# Patient Record
Sex: MALE | URBAN - METROPOLITAN AREA
[De-identification: names, ages, dates, MRNs, and addresses within clinical notes are randomized per-mention and may not be internally consistent; named-entity substitution may affect disease eponyms.]

---

## 2022-10-14 ENCOUNTER — ATHLETIC TRAINING (OUTPATIENT)
Dept: SPORTS MEDICINE | Facility: OTHER | Age: 22
End: 2022-10-14

## 2022-10-14 DIAGNOSIS — M25.552 LEFT HIP PAIN: Primary | ICD-10-CM

## 2022-10-14 DIAGNOSIS — S76.012A STRAIN OF HIP FLEXOR, LEFT, INITIAL ENCOUNTER: ICD-10-CM

## 2022-10-16 ENCOUNTER — ATHLETIC TRAINING (OUTPATIENT)
Dept: SPORTS MEDICINE | Facility: OTHER | Age: 22
End: 2022-10-16

## 2022-10-16 DIAGNOSIS — M25.552 LEFT HIP PAIN: Primary | ICD-10-CM

## 2022-10-16 DIAGNOSIS — S76.012D STRAIN OF HIP FLEXOR, LEFT, SUBSEQUENT ENCOUNTER: ICD-10-CM

## 2022-10-17 NOTE — PROGRESS NOTES
Athletic Training Hip/Thigh Evaluation     Name: Hoda Evans  Age: 23 y o    School District: [employer]  Sport: Wrestling  Date of Assessment: 10/14/2022     Assessment/Plan:      Visit Diagnosis: Left hip pain [M25 552]     Treatment Plan: Decrease pain, Hip Mobility and strength    [x]  Follow-up PRN  []  Follow-up prior to next practice/game for re-evaluation  [x]  Daily treatment/rehab  Progress note expected weekly  Referral:      [x]  Not needed at this time  []  Referred to:      [x]  Coaching staff notified  []  Parent/Guardian Notified      Subjective:     Date of Injury: 10/13/22     Injury occurred during:      [x]  Practice  []  Competition  []  Other:      Mechanism: Pt stated they noticed discomfort yesterday 10/13/22 and thought it was just sore  Pt cont to feel it today and his teammate suggested to get it checked out as they are just starting their season      Previous History: Knee pathology     Reported Symptoms:      [] Pain with rest [] Pressure   [x] Pain with activity [] Burning   [x] Pain with stairs [] Weakness   [x] Sharp pain [] Loss of motion   [] Dull pain [x] Clicking   [] Felt pop [] Snapping sensation   [] Felt give way [] Radiating pain   [] Grinding          Objective:    Observation:      []  No observable findings compared bilaterally     [] Swelling [] Genu recurvatum   [] Deformity [] Genu valgum   [] Ecchymosis [] Genu varus   [x] Abnormal gait [] Hip anteversion   [] Atrophy [] Hip retroversion   [x] Muscle spasm [] Patella abnormality   [] Spine curvature          Palpation: TTP over aidan lateral hip     Active Range of Motion:        Full  ROM Limited  ROM Pain  with  ROM No  Motion   Hip Flexion  [x] [] [x] []   Hip Extension [x] [] [] []   Hip Abduction [x] [] [x] []   Hip Adduction [] [] [] []   Hip Internal Rotation [x] [] [x] []   Hip External Rotation [x] [] [x] []   Knee Flexion [x] [] [] []   Knee Extension [x] [] [] []      Manual Muscle Tests: Not performed []                     5 4+ 4 4- 3 or  Under   Hip Flexion  [x] [] [] [] []   Hip Extension [x] [] [] [] []   Hip Abduction [] [x] [] [] []   Hip Adduction [] [x] [] [] []   Hip Internal Rotation [x] [] [] [] []   Hip External Rotation [x] [] [] [] []   Knee Flexion [x] [] [] [] []   Knee Extension [x] [] [] [] []      Special Tests:        (+)  Tightness (+)  Pain (-)  WNL Not  Tested   Fulcrum [] [] [] [x]   Ely’s [] [] [] [x]   Lynda Fly [] [] [x] []   [place] (Modified Lynda Bill) [] [] [x] []   Katie Manley []  [] [] [x]   Piriformis [] [] [] [x]   CHEMA [] [] [] [x]   FADIR [] [] [] [x]   SI Compression/Distraction [] [] [] [x]     (+)  Clicking (+)  Pain (-)  WNL Not  Tested   Hip Scour [] [] [] [x]     (+)  POS   (-)  WNL Not  Tested   Long Sit Test [] Leg  Discrepancy [] [x]   Trendelenberg's  [] Pelvic  Drop [] [x]       Treatment Log:     Date:  10/14/22   Playing Status:  As Tolerated         Exercise/Treatment      ice bag 20min

## 2022-10-18 NOTE — PROGRESS NOTES
Athletic Training Progress Note    Name: Salima Tai  Age: 23 y o  Assessment/Plan:     Visit Diagnosis: Left hip pain [M25 552]    Treatment Plan: Increase hip mobility/strength    []  Follow-up PRN  []  Follow-up prior to next practice/game for re-evaluation  [x]  Daily treatment/rehab  Progress note expected weekly  Subjective: Pt reported to the [facility] today to cont treatment on their hip flexor  Pt stated the wrap and ice bath yesterday helped  PT stated their discomfort is less today than it was the first day        Objective:   See treatment log below    Treatment Log:       Date: 10/16/22  10/14/22   Playing Status: As Tolerated  As Tolerated          Exercise/Treatment       ice bag  20min    MHP 10min      supine SLR  3x10 7 5#      supine banded hip flexion 3x10      Standing 4-way hip banded 3x10      Box heel taps 3x10      ice bath 12min                                  Pt will cont to make appointments for rehab 2-3x/wk

## 2022-11-01 ENCOUNTER — ATHLETIC TRAINING (OUTPATIENT)
Dept: SPORTS MEDICINE | Facility: OTHER | Age: 22
End: 2022-11-01

## 2022-11-01 DIAGNOSIS — S76.012D STRAIN OF HIP FLEXOR, LEFT, SUBSEQUENT ENCOUNTER: Primary | ICD-10-CM

## 2022-11-01 DIAGNOSIS — M25.552 LEFT HIP PAIN: ICD-10-CM

## 2022-11-01 NOTE — PROGRESS NOTES
Athletic Training Progress Note    Name: Zoey Tapia  Age: 23 y o  Assessment/Plan:     Visit Diagnosis: Strain of hip flexor, left, subsequent encounter [S74 136D]    Treatment Plan: Increase hip mobility/strength    []  Follow-up PRN  []  Follow-up prior to next practice/game for re-evaluation  [x]  Daily treatment/rehab  Progress note expected weekly  Subjective: Pt reported to the [facility] today to cont treatment on their hip flexor  Pt stated the wrap and ice bath yesterday helped  PT stated their discomfort is less today than it was the first day  Objective:   See treatment log below    Treatment Log:       Date: 11/1 10/16/22  10/14/22   Playing Status: As tolerated As Tolerated  As Tolerated           Exercise/Treatment        ice bag   20min    MHP  10min      supine SLR   3x10 7 5#      supine banded hip flexion  3x10      Standing 4-way hip banded  3x10      Box heel taps  3x10      ice bath 12' 12min      PRRT 3'       Hip flexion eccentric 3x4                       Pt will cont to make appointments for rehab 2-3x/wk    11/1  Pt is preparing to wrestle on Saturday 11/5  He is still limping and not wrestling live yet    LB ATC

## 2022-11-03 ENCOUNTER — ATHLETIC TRAINING (OUTPATIENT)
Dept: SPORTS MEDICINE | Facility: OTHER | Age: 22
End: 2022-11-03

## 2022-11-03 DIAGNOSIS — S76.012D STRAIN OF HIP FLEXOR, LEFT, SUBSEQUENT ENCOUNTER: Primary | ICD-10-CM

## 2022-11-03 DIAGNOSIS — M25.552 LEFT HIP PAIN: ICD-10-CM

## 2022-11-04 NOTE — PROGRESS NOTES
Athletic Training Progress Note    Name: Vivek Su  Age: 23 y o  Assessment/Plan:     Visit Diagnosis: Strain of hip flexor, left, subsequent encounter [S75 693D]    Treatment Plan: Increase hip mobility/strength    []  Follow-up PRN  []  Follow-up prior to next practice/game for re-evaluation  [x]  Daily treatment/rehab  Progress note expected weekly  Subjective: Pt reported to the [facility] today to cont treatment on their hip flexor  Pt stated the wrap and ice bath yesterday helped  PT stated their discomfort is less today than it was the first day  Objective:   See treatment log below    Treatment Log:       Date: 11/3 11/1 10/16/22  10/14/22   Playing Status: As tolerated As tolerated As Tolerated  As Tolerated            Exercise/Treatment         ice bag    20min    MHP   10min      supine SLR    3x10 7 5#      supine banded hip flexion   3x10      Standing 4-way hip banded   3x10      Box heel taps   3x10      ice bath  12' 12min      PRRT 3' 3'       Hip flexion eccentric 3x4 3x4       Normatec 15'                 Pt will cont to make appointments for rehab 2-3x/wk    11/3  Pt states he is having trouble sleeping  Completed PRRT to decrease tightness and pain with ADLs  Will stance and move at practice  LB ATC    11/1  Pt is preparing to wrestle on Saturday 11/5  He is still limping and not wrestling live yet    LB ATC

## 2022-12-10 ENCOUNTER — ATHLETIC TRAINING (OUTPATIENT)
Dept: SPORTS MEDICINE | Facility: OTHER | Age: 22
End: 2022-12-10

## 2022-12-10 DIAGNOSIS — M25.552 LEFT HIP PAIN: Primary | ICD-10-CM

## 2022-12-10 DIAGNOSIS — S76.012A HIP STRAIN, LEFT, INITIAL ENCOUNTER: ICD-10-CM

## 2022-12-11 NOTE — PROGRESS NOTES
Athletic Training Progress Note    Name: Levis Soulier  Age: 23 y o  Assessment/Plan:     Visit Diagnosis: No primary diagnosis found  Treatment Plan: Increase hip mobility/strength    []  Follow-up PRN  []  Follow-up prior to next practice/game for re-evaluation  [x]  Daily treatment/rehab  Progress note expected weekly  Subjective: Pt reported to the [facility] today to cont treatment on their hip flexor  Pt stated the wrap and ice bath yesterday helped  PT stated their discomfort is less today than it was the first day  Objective:   See treatment log below    Treatment Log:       Date: 11/3 11/1 10/16/22  10/14/22   Playing Status: As tolerated As tolerated As Tolerated  As Tolerated            Exercise/Treatment         ice bag    20min    MHP   10min      supine SLR    3x10 7 5#      supine banded hip flexion   3x10      Standing 4-way hip banded   3x10      Box heel taps   3x10      ice bath  12' 12min      PRRT 3' 3'       Hip flexion eccentric 3x4 3x4       Normatec 15'                 Pt will cont to make appointments for rehab 2-3x/wk  12/10  Pt was able to compete during the first match of the year and has been continuing without any issue in his Left hip  LB ATC    11/3  Pt states he is having trouble sleeping  Completed PRRT to decrease tightness and pain with ADLs  Will stance and move at practice  LB ATC    11/1  Pt is preparing to wrestle on Saturday 11/5  He is still limping and not wrestling live yet    LB ATC

## 2022-12-13 ENCOUNTER — ATHLETIC TRAINING (OUTPATIENT)
Dept: SPORTS MEDICINE | Facility: OTHER | Age: 22
End: 2022-12-13

## 2022-12-13 DIAGNOSIS — M79.641 HAND PAIN, RIGHT: ICD-10-CM

## 2022-12-13 DIAGNOSIS — L03.011 CELLULITIS OF RIGHT THUMB: Primary | ICD-10-CM

## 2022-12-13 NOTE — PROGRESS NOTES
12/13  Pt was in the hospital for Right Thumb cellulitis after a bite during wrestling on 12/3    LB ATC

## 2023-01-21 ENCOUNTER — ATHLETIC TRAINING (OUTPATIENT)
Dept: SPORTS MEDICINE | Facility: OTHER | Age: 23
End: 2023-01-21

## 2023-01-21 DIAGNOSIS — S96.911A STRAIN OF RIGHT ANKLE, INITIAL ENCOUNTER: Primary | ICD-10-CM

## 2023-01-21 DIAGNOSIS — M25.571 ACUTE RIGHT ANKLE PAIN: ICD-10-CM

## 2023-01-21 NOTE — PROGRESS NOTES
1/21/23  A: Right Ankle strain  S: Hurt after the match  Wrestled again  O: Limited DF to pain  P: Wrestling one more match    LB ATC

## 2023-10-13 ENCOUNTER — ATHLETIC TRAINING (OUTPATIENT)
Dept: SPORTS MEDICINE | Facility: OTHER | Age: 23
End: 2023-10-13

## 2023-10-13 DIAGNOSIS — M76.899 QUADRICEPS TENDONITIS: Primary | ICD-10-CM

## 2023-10-13 NOTE — PROGRESS NOTES
Athletic Training Knee Evaluation    Name: Norma Mcclendon  Age: 26 y.o.   School District: [employer]   Sport: Men's Wrestling  Date of Assessment: 10/13/2023    Assessment/Plan:     Visit Diagnosis: Quadriceps tendonitis [M76.899]    Treatment Plan:     []  Follow-up PRN. [x]  Follow-up prior to next practice/game for re-evaluation. []  Daily treatment/rehab. Progress note expected weekly.      Referral:     []  Not needed at this time  []  Referred to:     [x]  Coaching staff notified  []  Parent/Guardian Notified    Subjective:    Date of Injury: 10/13/23    Injury occurred during:     [x]  Practice  []  Competition  []  Other: And going for a run before practice    Mechanism: Running    Previous History: Previous surgeries of left knee    Reported Symptoms:     [] Felt pop [] Grinding   [] Kim Duster a pop [] Pressure   [] Pain with rest [] Burning   [x] Pain with activity [] Weakness   [] Pain with stairs [] Loss of motion   [] Sharp pain [] Clicking   [x] Dull pain [] Snapping sensation   [] Radiating pain [] Locking   [] Felt give way       Objective:    Observation:     [x]  No observable findings compared bilaterally    [] Swelling [] Genu recurvatum   [] Effusion [] Genu valgum   [] Deformity [] Genu varus   [] Ecchymosis [] Patella sekou   [] Abnormal gait [] Patella baja   [] Atrophy [] Squinting patellae   [] Muscle spasm [] “Frog eye” patellae     Palpation: TTP on quad tendon and medial patella    Active Range of Motion:      Full  ROM Limited  ROM Pain  with  ROM No  Motion   Knee Flexion [x] [] [] []   Knee Extension [x] [] [] []   Hip Flexion [x] [] [] []   Hip Extension [x] [] [] []   Hip Abduction [x] [] [] []   Hip Adduction [x] [] [] []   Dorsiflexion [] [] [] []   Plantarflexion [] [] [] []     Manual Muscle Tests:     Not performed []             5 4+ 4 4- 3 or  Under   Knee Flexion [x] [] [] [] []   Knee Extension [x] [] [] [] []   Hip Flexion [x] [] [] [] []   Hip Extension [x] [] [] [] []   Hip Abduction [x] [] [] [] []   Hip Adduction [x] [] [] [] []   Dorsiflexion [] [] [] [] []   Plantarflexion [] [] [] [] []     Special Tests:      (+)  Laxity (+)  Pain (-)  WNL Not  Tested   Lachman [] [] [x] []   Anterior Drawer [] [] [x] []   Pivot Shift [] [] [] []   Posterior Drawer [] [] [x] []   Sag [] [] [] []   Valgus (0 Degrees) [] [] [x] []   Valgus (30 Degrees) [] [] [x] []   Varus (0 Degrees) [] [] [x] []   Varus (30 Degrees) [] [] [x] []   Rosetta [] [] [x] []   Thessally's [] [] [x] []   Apley's [] [] [x] []   Reagan's [] [] [] []   Patellar Apprehension [] [] [] []   Patellar Glide [] [] [] []   Ballotable Patella  [] [] [] []     Treatment Log:  Pt was instructed to come back if there was no improvement in their knee.   Date:    Playing Status:        Exercise/Treatment

## 2024-11-25 ENCOUNTER — ATHLETIC TRAINING (OUTPATIENT)
Dept: SPORTS MEDICINE | Facility: OTHER | Age: 24
End: 2024-11-25

## 2024-11-25 DIAGNOSIS — S06.0X0A CONCUSSION WITHOUT LOSS OF CONSCIOUSNESS, INITIAL ENCOUNTER: Primary | ICD-10-CM

## 2024-11-25 NOTE — PROGRESS NOTES
"Athletic Training Head Injury Evaluation     Name: Sam Barkley  Age: 24 y.o.   Sport: Wrestling     Assessment/Plan  Visit Diagnosis: Concussion without loss of consciousness     Treatment Plan:   [x]  Follow-up daily.   []  Follow-up prior to next practice/game for re-evaluation.  []  Daily treatment/rehab. Progress note expected weekly.      Referral:   []  Not needed at this time  []  Will re-evaluate tomorrow to determine if referral is needed  [x]  Referred to: Will be scheduled with physician     Subjective:  Date of Assessment: 11/25/2024  Date of Injury: 11/23     History: Ath was competing in wrestling match Saturday 11/23 when he was hit in the eye. He was sent to ED and had his eyelid glued due to laceration. He reports feeling concussive symptoms then, but wanted to leave the ED and be treated by his AT staff for this. He comes in today stating he has had symptoms throughout the weekend with sensitivity to light and pressure in the head being the worst. Ath did not vomit but states he was close to it due to nausea.     How many diagnosed concussions has the athlete had in the past?: 0         Has the athlete ever been: Yes No   Hospitalized for a head injury? [] [x]   Diagnosed/treated for headache disorder or migraines? [] [x]   Diagnosed with a learning disability/dyslexia? [] [x]   Diagnosed with ADD/ADHD [] [x]   Diagnosed with depression, anxiety, or other psychiatric disorder? [] [x]      Current medications? If yes, please list:   [x]  None  []  Yes:          Symptom Evaluation     0 = No Symptoms; 1 or 2 = Mild Symptoms; 3 or 4 = Moderate Symptoms, 5 or 6 = Severe Symptoms       (Insert Columns as needed for subsequent dates)  Date: 11/25/2024   Symptom Symptom Score   Headache 3    Pressure in head  5   Neck Pain  0   Nausea or vomiting  4   Dizziness  4   Blurred Vision  4   Balance Problems  2   Sensitivity to light  6   Sensitivity to noise  5   Feeling slowed down  3   Feeling like \"in a " "fog\"  3   Don't feel right  4   Difficulty concentrating  3   Difficulty remembering  3   Fatigue or low energy  2   Confusion  1   Drowsiness  4   More emotional  4   Irritability  1   Sadness  2   Nervous or Anxious  1   Trouble falling asleep (if applicable)  0   Total number of Symptoms (of 22):  20   Symptom Severity Score (of 132):  64   Do your symptoms get worse with physical activity?  Yes   Do your symptoms get worse with mental activity?  Not sure         If 100% is feeling perfectly normal, what percent of normal do you feel?: 40%     If not 100%, why?: Feel like loud sounds or extreme light or a hit to the head would hurt extremely long     Cognitive Screening     Orientation 0 1   What month is it? [] [x]   What is the date today? [] [x]   What is the day of the week? [] [x]   What year is it? [] [x]   What time is it right now? (Within 1 hour) [] [x]   Orientation Score  5 of 5      Immediate Memory: 13/30     Concentration      Digits Backwards   [x] [] [] Yes No 0/1   4-9-3 5-2-6 1-4-2 [x] [] 1    6-2-9 4-1-5 6-5-8 [] []    3-8-1-4 1-7-9-5 6-8-3-1 [x] []  1   3-2-7-9 4-9-6-8 3-4-8-1 [] []    6-2-9-7-1 4-8-5-2-7 4-9-1-5-3 [x] []  1   1-5-2-8-6 6-1-8-4-3 6-8-2-5-1 [] []    7-1-8-4-6-2 8-3-1-9-6-4 3-7-6-5-1-9 [x] [] 1    5-3-9-1-4-8 7-2-4-8-5-6 9-2-6-5-1-4 [] []    Digits Backwards Score  4 of 4   Months in Reverse Order  0 1   Dec-Nov-Oct-Sept-Aug-July-Jun-May-Apr-Mar-Feb-Jan [] [x]   Month Score  1 of 1   Concentration Total Score (Digits + Months) 5  of 5      Neurological Screen       Yes No   Can the patient read aloud (e.g. symptom check-list) and follow instructions without difficulty? [x] []   Does the patient have a full pain-free PASSIVE cervical spine movement? [x] []   Without moving their head or neck, can the patient look side-to-side and up-and-down without double vision? [] []   Can the patient perform the finger nose coordination test normally? [] []   Can the patient perform tandem " gait normally? [] []      Balance Examination  Modified Balance Error Scoring System (mBESS) testing     Which foot was tested (i.e. which is the non-dominant foot):  [x]  Left  []  Right     Testing surface (hard floor, field, etc.): Floor     Footwear (shoes, barefoot, braces, tape, etc.): Socks      Condition Errors   Double leg stance  0 of 10   Single leg stance (non-dominant foot)  1 of 10   Tandem stance (non-dominant foot at back)  4 of 10   Total Errors  5 of 30        Jamaal completed ImPACT test that has been uploaded to his chart. He will f/u with AT tomorrow to discuss seeing team physician. I advised he wear sunglasses and stay hydrated. He was given 3 packets of extra strength tylenol to assist with headache. He was educated on proper dosage. Ath was advised to go back to his room to rest instead of watching practice.